# Patient Record
Sex: FEMALE | Race: WHITE | NOT HISPANIC OR LATINO | Employment: PART TIME
[De-identification: names, ages, dates, MRNs, and addresses within clinical notes are randomized per-mention and may not be internally consistent; named-entity substitution may affect disease eponyms.]

---

## 2019-04-18 ENCOUNTER — RECORDS - HEALTHEAST (OUTPATIENT)
Dept: ADMINISTRATIVE | Facility: OTHER | Age: 59
End: 2019-04-18

## 2019-04-18 LAB — HPV_EXT - HISTORICAL: NORMAL

## 2019-10-01 ENCOUNTER — OFFICE VISIT - HEALTHEAST (OUTPATIENT)
Dept: FAMILY MEDICINE | Facility: CLINIC | Age: 59
End: 2019-10-01

## 2019-10-01 DIAGNOSIS — Z01.818 PRE-OPERATIVE GENERAL PHYSICAL EXAMINATION: ICD-10-CM

## 2019-10-01 DIAGNOSIS — N81.89 OTHER FEMALE GENITAL PROLAPSE: ICD-10-CM

## 2019-10-01 DIAGNOSIS — J30.89 SEASONAL ALLERGIC RHINITIS DUE TO OTHER ALLERGIC TRIGGER: ICD-10-CM

## 2019-10-01 DIAGNOSIS — Z12.11 SCREEN FOR COLON CANCER: ICD-10-CM

## 2019-10-01 LAB
ALBUMIN SERPL-MCNC: 4.2 G/DL (ref 3.5–5)
ALP SERPL-CCNC: 73 U/L (ref 45–120)
ALT SERPL W P-5'-P-CCNC: 13 U/L (ref 0–45)
ANION GAP SERPL CALCULATED.3IONS-SCNC: 5 MMOL/L (ref 5–18)
AST SERPL W P-5'-P-CCNC: 20 U/L (ref 0–40)
BILIRUB SERPL-MCNC: 0.5 MG/DL (ref 0–1)
BUN SERPL-MCNC: 14 MG/DL (ref 8–22)
CALCIUM SERPL-MCNC: 10.9 MG/DL (ref 8.5–10.5)
CHLORIDE BLD-SCNC: 106 MMOL/L (ref 98–107)
CO2 SERPL-SCNC: 29 MMOL/L (ref 22–31)
CREAT SERPL-MCNC: 0.78 MG/DL (ref 0.6–1.1)
ERYTHROCYTE [DISTWIDTH] IN BLOOD BY AUTOMATED COUNT: 11.3 % (ref 11–14.5)
GFR SERPL CREATININE-BSD FRML MDRD: >60 ML/MIN/1.73M2
GLUCOSE BLD-MCNC: 98 MG/DL (ref 70–125)
HCT VFR BLD AUTO: 40.1 % (ref 35–47)
HGB BLD-MCNC: 13.2 G/DL (ref 12–16)
MCH RBC QN AUTO: 30.8 PG (ref 27–34)
MCHC RBC AUTO-ENTMCNC: 33 G/DL (ref 32–36)
MCV RBC AUTO: 93 FL (ref 80–100)
PLATELET # BLD AUTO: 264 THOU/UL (ref 140–440)
PMV BLD AUTO: 7.3 FL (ref 7–10)
POTASSIUM BLD-SCNC: 4.4 MMOL/L (ref 3.5–5)
PROT SERPL-MCNC: 7.3 G/DL (ref 6–8)
RBC # BLD AUTO: 4.29 MILL/UL (ref 3.8–5.4)
SODIUM SERPL-SCNC: 140 MMOL/L (ref 136–145)
WBC: 4.6 THOU/UL (ref 4–11)

## 2019-10-01 RX ORDER — FEXOFENADINE HCL 180 MG/1
180 TABLET ORAL DAILY
Status: SHIPPED | COMMUNITY
Start: 2019-10-01 | End: 2021-07-29

## 2019-10-01 ASSESSMENT — MIFFLIN-ST. JEOR: SCORE: 1246.37

## 2019-10-04 LAB
ATRIAL RATE - MUSE: 72 BPM
DIASTOLIC BLOOD PRESSURE - MUSE: 80 MMHG
INTERPRETATION ECG - MUSE: NORMAL
P AXIS - MUSE: 43 DEGREES
PR INTERVAL - MUSE: 164 MS
QRS DURATION - MUSE: 88 MS
QT - MUSE: 374 MS
QTC - MUSE: 409 MS
R AXIS - MUSE: 16 DEGREES
SYSTOLIC BLOOD PRESSURE - MUSE: 120 MMHG
T AXIS - MUSE: 36 DEGREES
VENTRICULAR RATE- MUSE: 72 BPM

## 2019-10-04 ASSESSMENT — MIFFLIN-ST. JEOR: SCORE: 1247.05

## 2019-10-07 ENCOUNTER — ANESTHESIA - HEALTHEAST (OUTPATIENT)
Dept: SURGERY | Facility: HOSPITAL | Age: 59
End: 2019-10-07

## 2019-10-07 ENCOUNTER — SURGERY - HEALTHEAST (OUTPATIENT)
Dept: SURGERY | Facility: HOSPITAL | Age: 59
End: 2019-10-07

## 2019-10-07 ASSESSMENT — MIFFLIN-ST. JEOR: SCORE: 1246.37

## 2019-10-23 ENCOUNTER — RECORDS - HEALTHEAST (OUTPATIENT)
Dept: HEALTH INFORMATION MANAGEMENT | Facility: CLINIC | Age: 59
End: 2019-10-23

## 2020-01-28 ENCOUNTER — COMMUNICATION - HEALTHEAST (OUTPATIENT)
Dept: FAMILY MEDICINE | Facility: CLINIC | Age: 60
End: 2020-01-28

## 2021-06-02 NOTE — ANESTHESIA PREPROCEDURE EVALUATION
Anesthesia Evaluation      Patient summary reviewed   No history of anesthetic complications     Airway    Pulmonary - negative ROS                          Cardiovascular - negative ROS  ECG reviewed (NSR)        Neuro/Psych - negative ROS     Endo/Other - negative ROS      GI/Hepatic/Renal - negative ROS      Other findings: Results for [address] (MRN [id number]) as of 10/7/2019 14:22    10/1/2019 11:18  Sodium: 140  Potassium: 4.4  Chloride: 106  CO2: 29  Anion Gap, Calculation: 5  BUN: 14  Creatinine: 0.78  GFR MDRD Af Amer: >60  GFR MDRD Non Af Amer: >60  Results for [address] (MRN [id number]) as of 10/7/2019 14:22    10/1/2019 11:18  WBC: 4.6  RBC: 4.29  Hemoglobin: 13.2  Hematocrit: 40.1  MCV: 93  MCH: 30.8  MCHC: 33.0  RDW: 11.3  Platelets: 264        Dental                         Anesthesia Plan  Planned anesthetic: general endotracheal  GAETT  Scopolamine for PONV  Antiemetics  Tylenol po pre op  Toradol at the end of case if okay with surgeon  Consider background propofol  Soft bite block  ASA 1   Induction: intravenous   Anesthetic plan and risks discussed with: patient    Post-op plan: routine recovery

## 2021-06-02 NOTE — ANESTHESIA POSTPROCEDURE EVALUATION
Patient: Radha Wilkinson  ROBOTIC ASSISTED LAPAROSCOPIC SUPRACERVICAL HYSTERECTOMY & SALPINGO-OOPHORECTOMY, SACROCOLPOPEXY, CYSTOSCOPY, POSTERIOR COLPORRHAPHY, PERINEORRHAPHY  Anesthesia type: general    Patient location: PACU  Last vitals:   Vitals Value Taken Time   /56 10/7/2019  8:20 PM   Temp 36.9  C (98.4  F) 10/7/2019  7:28 PM   Pulse 84 10/7/2019  8:19 PM   Resp 27 10/7/2019  8:19 PM   SpO2 99 % 10/7/2019  8:19 PM   Vitals shown include unvalidated device data.  Post vital signs: stable  Level of consciousness: awake and responds to simple questions  Post-anesthesia pain: pain controlled  Post-anesthesia nausea and vomiting: no  Pulmonary: unassisted, return to baseline  Cardiovascular: stable and blood pressure at baseline  Hydration: adequate  Anesthetic events: no    QCDR Measures:  ASA# 11 - Carleen-op Cardiac Arrest: ASA11B - Patient did NOT experience unanticipated cardiac arrest  ASA# 12 - Carleen-op Mortality Rate: ASA12B - Patient did NOT die  ASA# 13 - PACU Re-Intubation Rate: ASA13B - Patient did NOT require a new airway mgmt  ASA# 10 - Composite Anes Safety: ASA10A - No serious adverse event    Additional Notes:

## 2021-06-02 NOTE — ANESTHESIA CARE TRANSFER NOTE
Last vitals:   Vitals:    10/07/19 1928   BP: 132/62   Pulse: 93   Resp: 16   Temp: 36.9  C (98.4  F)   SpO2: 100%     Patient's level of consciousness is awake  Spontaneous respirations: yes  Maintains airway independently: yes  Dentition unchanged: yes  Oropharynx: oropharynx clear of all foreign objects    QCDR Measures:  ASA# 20 - Surgical Safety Checklist: WHO surgical safety checklist completed prior to induction    PQRS# 430 - Adult PONV Prevention: 4558F - Pt received => 2 anti-emetic agents (different classes) preop & intraop  ASA# 8 - Peds PONV Prevention: NA - Not pediatric patient, not GA or 2 or more risk factors NOT present  PQRS# 424 - Carleen-op Temp Management: 4559F - At least one body temp DOCUMENTED => 35.5C or 95.9F within required timeframe  PQRS# 426 - PACU Transfer Protocol: - Transfer of care checklist used  ASA# 14 - Acute Post-op Pain: ASA14B - Patient did NOT experience pain >= 7 out of 10     Volatile agents turned off, muscle relaxant reversed, 4/4 twitches with sustained tetany. Pt breathing spontaneously with adequate tidal volumes, following commands, gently suctioned oropharynx, extubated without issue. 10LPM O2 applied via face mask.Transported by CRNA and RN to recovery.

## 2021-06-03 VITALS
HEART RATE: 97 BPM | BODY MASS INDEX: 25.03 KG/M2 | OXYGEN SATURATION: 98 % | DIASTOLIC BLOOD PRESSURE: 80 MMHG | SYSTOLIC BLOOD PRESSURE: 120 MMHG | TEMPERATURE: 98.1 F | HEIGHT: 65 IN | WEIGHT: 150.25 LBS

## 2021-06-03 VITALS — BODY MASS INDEX: 25.03 KG/M2 | WEIGHT: 150.25 LBS | HEIGHT: 65 IN

## 2021-06-05 NOTE — TELEPHONE ENCOUNTER
Medication Request  Medication name: Anti Nausea medication   Requested Pharmacy: [id number]  Reason for request: Patient states she is going vacation to [place] a week form now she is going to be in boat  For 7 days requesting anti Nausea medication patch that last for 7 days she will not get sick when she is in  sea .  When did you use medication last?:  Unknown   Patient offered appointment:  N/A - electronic request  Okay to leave a detailed message: no

## 2021-06-05 NOTE — TELEPHONE ENCOUNTER
Please have her schedule a quick office visit appointment to discuss about traveling and  medication to help for nausea.

## 2021-06-16 PROBLEM — N81.3 UTEROVAGINAL PROLAPSE, COMPLETE: Status: ACTIVE | Noted: 2019-10-08

## 2021-06-16 PROBLEM — N81.11 CYSTOCELE, MIDLINE: Status: ACTIVE | Noted: 2019-10-08

## 2021-06-16 PROBLEM — N81.6 RECTOCELE: Status: ACTIVE | Noted: 2019-10-08

## 2021-06-17 NOTE — PATIENT INSTRUCTIONS - HE
Patient Instructions by Radha Harmon MD at 10/1/2019 10:00 AM     Author: Radha Harmon MD Service: -- Author Type: Physician    Filed: 10/2/2019  1:39 PM Encounter Date: 10/1/2019 Status: Addendum    : Radha Harmon MD (Physician)    Related Notes: Original Note by Radha Harmon MD (Physician) filed at 10/2/2019  1:38 PM       Patient Education     Pelvic Organ Prolapse: After Surgery    You may go home the day of surgery, or you may stay in the hospital for 1 or more days. The length of your stay is based on the surgery you had.  Catheter, drain, and IV  After surgery, it may be hard for you to urinate for a few days or weeks. A thin tube (catheter) helps drain your bladder. You may have a Cuevas catheter, which is put into the bladder through the urethra, or a suprapubic catheter, which is put into the bladder through a small incision in your abdomen. The catheter may be removed while you are in the hospital, or it may stay in place for a few days after you go home. Tubes may drain fluid from your incision. IV (intravenous) lines give you fluids and medicines. If you have a vaginal incision, you may have gauze packing in your vagina for 24 hours.  Managing pain  While in the hospital, a nurse can give you medicine to control pain. Or you may have a PCA (patient-controlled analgesia) pump attached to your IV line. This pump lets you give yourself pain medicine. It is normal to feel some pain. But if the pain bothers you, tell your nurse right away. If you go home soon after surgery, you will have a prescription for pain medicine to take with you. Call your doctor if this medicine doesnt relieve your pain.  Getting out of bed  You may be asked to get up and walk a little before you leave the hospital. Walking keeps your blood moving and helps prevent blood clots. Once you can get out of bed, you may be helped to the bathroom to see if you can urinate on your  own. When you can stand on your own, you may be able to take a shower.  Your doctor will tell you when you can leave the hospital. An adult friend or family member should drive you home. When you get home, care for yourself as instructed and schedule any needed follow-up visits with your doctor.  Don't lift, do strenuous exercise, or have sex for several weeks. Ask your doctor when you may resume these activities.  If you are still using the catheter when you leave the hospital, you may be told to:    Keep the catheter and the skin around it clean.    Clamp a suprapubic catheter to see whether you can urinate on your own. Then, measure the urine that drains from the catheter after you urinate.  Date Last Reviewed: 8/1/2017 2000-2019 [employer]. [address] [place] [address]. All rights reserved. This information is not intended as a substitute for professional medical care. Always follow your healthcare professional's instructions.           Hold all supplements, aspirin and NSAIDs for 7 days prior to surgery.  Follow your surgeon's direction on when to stop eating and drinking prior to surgery.  Your surgeon will be managing your pain after your surgery.    Remove all jewelry and metal piercings before your surgery.   Remove nail polish from fingers before surgery.

## 2021-06-26 ENCOUNTER — HEALTH MAINTENANCE LETTER (OUTPATIENT)
Age: 61
End: 2021-06-26

## 2021-06-28 NOTE — PROGRESS NOTES
Progress Notes by Radha Harmon MD at 10/1/2019 10:00 AM     Author: Radha Harmon MD Service: -- Author Type: Physician    Filed: 10/2/2019  1:39 PM Encounter Date: 10/1/2019 Status: Signed    : Radha Harmon MD (Physician)       Preoperative Exam    Scheduled Procedure: Hysterectomy w/pelvic mesh placement  Surgery Date:  10/7/2019   Surgery Location: [facility], fax [phone]    Surgeon:  Dr. Paulette Payne    Assessment/Plan:     1. Pre-operative general physical examination    - Comprehensive Metabolic Panel  - HM2(CBC w/o Differential)  - Electrocardiogram Perform - Clinic; Future  - Electrocardiogram Perform - Clinic    2. Other female genital prolapse  Scheduled for surgery.    3. Screen for colon cancer  Risk and benefit discussed, she will schedule screening colonoscopy at her earliest convenience after her recovery from above surgery.  - Ambulatory referral for Colonoscopy    4. Seasonal allergic rhinitis due to other allergic trigger  No recent exacerbation.    Surgical Procedure Risk: Low (reported cardiac risk generally < 1%)  Have you had prior anesthesia?: Yes  Have you or any family members had a previous anesthesia reaction:  No  Do you or any family members have a history of a clotting or bleeding disorder?: Yes: brother-blood clot; sister-blood clot  Cardiac Risk Assessment: no increased risk for major cardiac complications    Reviewed risks (not limited to bleeding,infection,pain,un-anticipated response to anesthesia ecc) and benefits (diagnostic and therapeutic) of surgery with patient, she understands the risks of the procedure and would like to proceed.  Patient's active problems diagnostically and therapeutically optimized for planned procedure with, Local or General anesthesia      Functional Status: Independent  Patient plans to recover at home with family.     Subjective:      Radha Wilkinson is a 58 y.o. female who presents for a  preoperative consultation.  New patient, 3 previous pregnancy and delivery, ongoing pressure in her lower abdomen, with pelvic organ prolapse not responding to conservative management, seen by GYN and deemed to be a good candidate for surgical treatment.    All other systems reviewed and are negative, other than those listed in the HPI.    Pertinent History  Do you have difficulty breathing or chest pain after walking up a flight of stairs: No  History of obstructive sleep apnea: No  Steroid use in the last 6 months: No  Frequent Aspirin/NSAID use: No  Prior Blood Transfusion: No  Prior Blood Transfusion Reaction: No  If for some reason prior to, during or after the procedure, if it is medically indicated, would you be willing to have a blood transfusion?:  There is no transfusion refusal.    Current Outpatient Medications   Medication Sig Dispense Refill   ? fexofenadine (ALLEGRA) 180 MG tablet Take 180 mg by mouth.     ? multivitamin (ONE A DAY) per tablet Take by mouth.       No current facility-administered medications for this visit.         Allergies   Allergen Reactions   ? Penicillins Hives   ? Cephalosporins Itching     And hives     ? Sulfa (Sulfonamide Antibiotics) Itching       There is no problem list on file for this patient.      History reviewed. No pertinent past medical history.    Past Surgical History:   Procedure Laterality Date   ? BREAST SURGERY Bilateral     AUGMENTATION   ? Vaginal Cyst Removal         Social History     Socioeconomic History   ? Marital status:      Spouse name: Not on file   ? Number of children: Not on file   ? Years of education: Not on file   ? Highest education level: Not on file   Occupational History   ? Not on file   Social Needs   ? Financial resource strain: Not on file   ? Food insecurity:     Worry: Not on file     Inability: Not on file   ? Transportation needs:     Medical: Not on file     Non-medical: Not on file   Tobacco Use   ? Smoking status:  "Former Smoker     Packs/day: 0.00     Last attempt to quit: 1990     Years since quittin.7   ? Smokeless tobacco: Never Used   Substance and Sexual Activity   ? Alcohol use: Yes     Frequency: 2-4 times a month   ? Drug use: Never   ? Sexual activity: Yes   Lifestyle   ? Physical activity:     Days per week: Not on file     Minutes per session: Not on file   ? Stress: Not on file   Relationships   ? Social connections:     Talks on phone: Not on file     Gets together: Not on file     Attends Je[employer]'s witness service: Not on file     Active member of club or organization: Not on file     Attends meetings of clubs or organizations: Not on file     Relationship status: Not on file   ? Intimate partner violence:     Fear of current or ex partner: Not on file     Emotionally abused: Not on file     Physically abused: Not on file     Forced sexual activity: Not on file   Other Topics Concern   ? Not on file   Social History Narrative   ? Not on file       Patient Care Team:  Radha Harmon MD as PCP - General (Family Medicine)          Objective:     Vitals:    10/01/19 1027   BP: 120/80   Pulse: 97   Temp: 98.1  F (36.7  C)   TempSrc: Oral   SpO2: 98%   Weight: 150 lb 4 oz (68.2 kg)   Height: 5' 5.25\" (1.657 m)         Physical Exam:  Physical Examination: General appearance - alert, well appearing, and in no distress  Mental status - alert, oriented to person, place, and time  Eyes - pupils equal and reactive, extraocular eye movements intact  Ears - bilateral TM's and external ear canals normal  Nose - normal and patent, no erythema, discharge or polyps  Mouth - mucous membranes moist, pharynx normal without lesions  Neck - supple, no significant adenopathy  Lymphatics - no palpable lymphadenopathy, no hepatosplenomegaly  Chest - clear to auscultation, no wheezes, rales or rhonchi, symmetric air entry  Heart - normal rate, regular rhythm, normal S1, S2, no murmurs, rubs, clicks or gallops  Abdomen - soft, " nontender, nondistended, no masses or organomegaly  Back exam - full range of motion, no tenderness, palpable spasm or pain on motion  Neurological - alert, oriented, normal speech, no focal findings or movement disorder noted  Musculoskeletal - no joint tenderness, deformity or swelling  Extremities - peripheral pulses normal, no pedal edema, no clubbing or cyanosis  Skin - normal coloration and turgor, no rashes, no suspicious skin lesions noted    Patient Instructions   Patient Education     Pelvic Organ Prolapse: After Surgery    You may go home the day of surgery, or you may stay in the hospital for 1 or more days. The length of your stay is based on the surgery you had.  Catheter, drain, and IV  After surgery, it may be hard for you to urinate for a few days or weeks. A thin tube (catheter) helps drain your bladder. You may have a Cuevas catheter, which is put into the bladder through the urethra, or a suprapubic catheter, which is put into the bladder through a small incision in your abdomen. The catheter may be removed while you are in the hospital, or it may stay in place for a few days after you go home. Tubes may drain fluid from your incision. IV (intravenous) lines give you fluids and medicines. If you have a vaginal incision, you may have gauze packing in your vagina for 24 hours.  Managing pain  While in the hospital, a nurse can give you medicine to control pain. Or you may have a PCA (patient-controlled analgesia) pump attached to your IV line. This pump lets you give yourself pain medicine. It is normal to feel some pain. But if the pain bothers you, tell your nurse right away. If you go home soon after surgery, you will have a prescription for pain medicine to take with you. Call your doctor if this medicine doesnt relieve your pain.  Getting out of bed  You may be asked to get up and walk a little before you leave the hospital. Walking keeps your blood moving and helps prevent blood clots. Once  you can get out of bed, you may be helped to the bathroom to see if you can urinate on your own. When you can stand on your own, you may be able to take a shower.  Your doctor will tell you when you can leave the hospital. An adult friend or family member should drive you home. When you get home, care for yourself as instructed and schedule any needed follow-up visits with your doctor.  Don't lift, do strenuous exercise, or have sex for several weeks. Ask your doctor when you may resume these activities.  If you are still using the catheter when you leave the hospital, you may be told to:    Keep the catheter and the skin around it clean.    Clamp a suprapubic catheter to see whether you can urinate on your own. Then, measure the urine that drains from the catheter after you urinate.  Date Last Reviewed: 8/1/2017 2000-2019 [employer]. [address] [place] [address]. All rights reserved. This information is not intended as a substitute for professional medical care. Always follow your healthcare professional's instructions.           Hold all supplements, aspirin and NSAIDs for 7 days prior to surgery.  Follow your surgeon's direction on when to stop eating and drinking prior to surgery.  Your surgeon will be managing your pain after your surgery.    Remove all jewelry and metal piercings before your surgery.   Remove nail polish from fingers before surgery.      EKG: Independently reviewed, normal sinus rhythm, nonspecific ST-T wave abnormalities.    Labs:  Recent Results (from the past 48 hour(s))   Electrocardiogram Perform - Clinic    Collection Time: 10/01/19 11:11 AM   Result Value Ref Range    SYSTOLIC BLOOD PRESSURE 120 mmHg    DIASTOLIC BLOOD PRESSURE 80 mmHg    VENTRICULAR RATE 72 BPM    ATRIAL RATE 72 BPM    P-R INTERVAL 164 ms    QRS DURATION 88 ms    Q-T INTERVAL 374 ms    QTC CALCULATION (BEZET) 409 ms    P Axis 43 degrees    R AXIS 16 degrees    T AXIS 36 degrees    MUSE  DIAGNOSIS       Normal sinus rhythm  Normal ECG  No previous ECGs available     Comprehensive Metabolic Panel    Collection Time: 10/01/19 11:18 AM   Result Value Ref Range    Sodium 140 136 - 145 mmol/L    Potassium 4.4 3.5 - 5.0 mmol/L    Chloride 106 98 - 107 mmol/L    CO2 29 22 - 31 mmol/L    Anion Gap, Calculation 5 5 - 18 mmol/L    Glucose 98 70 - 125 mg/dL    BUN 14 8 - 22 mg/dL    Creatinine 0.78 0.60 - 1.10 mg/dL    GFR MDRD Af Amer >60 >60 mL/min/1.73m2    GFR MDRD Non Af Amer >60 >60 mL/min/1.73m2    Bilirubin, Total 0.5 0.0 - 1.0 mg/dL    Calcium 10.9 (H) 8.5 - 10.5 mg/dL    Protein, Total 7.3 6.0 - 8.0 g/dL    Albumin 4.2 3.5 - 5.0 g/dL    Alkaline Phosphatase 73 45 - 120 U/L    AST 20 0 - 40 U/L    ALT 13 0 - 45 U/L   HM2(CBC w/o Differential)    Collection Time: 10/01/19 11:18 AM   Result Value Ref Range    WBC 4.6 4.0 - 11.0 thou/uL    RBC 4.29 3.80 - 5.40 mill/uL    Hemoglobin 13.2 12.0 - 16.0 g/dL    Hematocrit 40.1 35.0 - 47.0 %    MCV 93 80 - 100 fL    MCH 30.8 27.0 - 34.0 pg    MCHC 33.0 32.0 - 36.0 g/dL    RDW 11.3 11.0 - 14.5 %    Platelets 264 140 - 440 thou/uL    MPV 7.3 7.0 - 10.0 fL        Immunization History   Administered Date(s) Administered   ? Influenza,seasonal, Inj IIV3 09/23/2009   ? Tdap 10/01/2019           Electronically signed by Radha Harmon MD 10/02/19 10:21 AM

## 2021-07-23 ENCOUNTER — LAB (OUTPATIENT)
Dept: LAB | Facility: CLINIC | Age: 61
End: 2021-07-23

## 2021-07-23 ENCOUNTER — OFFICE VISIT (OUTPATIENT)
Dept: INTERNAL MEDICINE | Facility: CLINIC | Age: 61
End: 2021-07-23
Payer: COMMERCIAL

## 2021-07-23 VITALS
WEIGHT: 153 LBS | BODY MASS INDEX: 25.27 KG/M2 | DIASTOLIC BLOOD PRESSURE: 82 MMHG | OXYGEN SATURATION: 98 % | SYSTOLIC BLOOD PRESSURE: 124 MMHG | HEART RATE: 88 BPM

## 2021-07-23 DIAGNOSIS — Z00.00 ROUTINE GENERAL MEDICAL EXAMINATION AT A HEALTH CARE FACILITY: ICD-10-CM

## 2021-07-23 DIAGNOSIS — Z00.00 ROUTINE GENERAL MEDICAL EXAMINATION AT A HEALTH CARE FACILITY: Primary | ICD-10-CM

## 2021-07-23 LAB
ALBUMIN SERPL-MCNC: 4 G/DL (ref 3.4–5)
ALP SERPL-CCNC: 74 U/L (ref 40–150)
ALT SERPL W P-5'-P-CCNC: 24 U/L (ref 0–50)
ANION GAP SERPL CALCULATED.3IONS-SCNC: 1 MMOL/L (ref 3–14)
AST SERPL W P-5'-P-CCNC: 24 U/L (ref 0–45)
BILIRUB SERPL-MCNC: 0.4 MG/DL (ref 0.2–1.3)
BUN SERPL-MCNC: 18 MG/DL (ref 7–30)
CALCIUM SERPL-MCNC: 9.6 MG/DL (ref 8.5–10.1)
CHLORIDE BLD-SCNC: 110 MMOL/L (ref 94–109)
CHOLEST SERPL-MCNC: 222 MG/DL
CO2 SERPL-SCNC: 29 MMOL/L (ref 20–32)
CREAT SERPL-MCNC: 0.72 MG/DL (ref 0.52–1.04)
DEPRECATED CALCIDIOL+CALCIFEROL SERPL-MC: 23 UG/L (ref 20–75)
FASTING STATUS PATIENT QL REPORTED: YES
GFR SERPL CREATININE-BSD FRML MDRD: 90 ML/MIN/1.73M2
GLUCOSE BLD-MCNC: 94 MG/DL (ref 70–99)
HDLC SERPL-MCNC: 91 MG/DL
LDLC SERPL CALC-MCNC: 120 MG/DL
NONHDLC SERPL-MCNC: 131 MG/DL
POTASSIUM BLD-SCNC: 4.5 MMOL/L (ref 3.4–5.3)
PROT SERPL-MCNC: 7.7 G/DL (ref 6.8–8.8)
SODIUM SERPL-SCNC: 140 MMOL/L (ref 133–144)
TRIGL SERPL-MCNC: 55 MG/DL
TSH SERPL DL<=0.005 MIU/L-ACNC: 0.75 MU/L (ref 0.4–4)

## 2021-07-23 PROCEDURE — 80053 COMPREHEN METABOLIC PANEL: CPT | Performed by: PATHOLOGY

## 2021-07-23 PROCEDURE — 84443 ASSAY THYROID STIM HORMONE: CPT | Performed by: PATHOLOGY

## 2021-07-23 PROCEDURE — 36415 COLL VENOUS BLD VENIPUNCTURE: CPT | Performed by: PATHOLOGY

## 2021-07-23 PROCEDURE — 82306 VITAMIN D 25 HYDROXY: CPT | Mod: 90 | Performed by: PATHOLOGY

## 2021-07-23 PROCEDURE — 99386 PREV VISIT NEW AGE 40-64: CPT | Performed by: INTERNAL MEDICINE

## 2021-07-23 PROCEDURE — 80061 LIPID PANEL: CPT | Performed by: PATHOLOGY

## 2021-07-23 ASSESSMENT — PAIN SCALES - GENERAL: PAINLEVEL: NO PAIN (0)

## 2021-07-23 NOTE — NURSING NOTE
Chief Complaint   Patient presents with     Physical     pt here for physical       Ed Charles CMA, EMT at 8:04 AM on 7/23/2021.

## 2021-07-23 NOTE — PATIENT INSTRUCTIONS
I recommend getting the Shingles Vaccine at a local pharmacy, it will be less cost to you than in the clinic.  It was a pleasure to meet you today!  I will be in touch about your test results via [employer].    Dr. Gomez     [facility] Refill Request Information:    Please contact your pharmacy regarding any request for medication refills. The [facility] prescription fax number is [phone]. Please allow three business days for routine medication refills and five business days for controlled substance medication refills.    [facility] Test Result Notification Information:    You will be notified within seven to ten day of your appointment regarding your test results. If you are on MyChart, you will be notified as soon as the provider has reviewed and signed the results.     If you need anything else, feel free to contact the [facility] Center at [phone].    To schedule a mammgram, please call radiology scheduling at [phone].

## 2021-07-29 NOTE — PROGRESS NOTES
Radha is a very pleasant 60 year old female who was seen today for physical and establish care.    In general she feels good.  10+ ROS negative for any concerns.  She is overdue for a few screening tests, including colonoscopy, mammogram, labs.    Mental health is doing well, all things considered; was able to adapt to the stress of the pandemic.     No changes to past, family, or social history.  I discontinued several medications that were used for post surgical care in 2019.    PHYSICAL EXAM:  /82 (BP Location: Right arm, Patient Position: Sitting, Cuff Size: Adult Regular)   Pulse 88   Wt 69.4 kg (153 lb)   SpO2 98%   BMI 25.27 kg/m    Constitutional: no distress, comfortable, pleasant   Eyes: anicteric, normal extra-ocular movements   Ears, Nose and Throat: tympanic membranes clear, nose clear and free of lesions, throat clear, neck supple with full range of motion, no thyromegaly.   Cardiovascular: regular rate and rhythm, normal S1 and S2, no murmurs, rubs or gallops, peripheral pulses full and symmetric   Respiratory: clear to auscultation, no wheezes or crackles, normal breath sounds   Gastrointestinal: positive bowel sounds, nontender, no hepatosplenomegaly, no masses   Musculoskeletal: knees full range of motion, no edema   Skin: no concerning lesions, no jaundice   Neurological: normal gait, no tremor   Psychological: appropriate mood   Lymphatic: no cervical lymphadenopathy and no pedal edema      A/P 60 year old female here for physical/establish care, doing well    Routine general medical examination at a health care facility  -     Lipid Profile FASTING; Future  -     Comprehensive metabolic panel; Future  -     TSH with free T4 reflex; Future  -     Vitamin D Deficiency; Future  -     Adult Gastro Ref - Procedure Only; Future  -     Mammogram, screening w beth (3D); Future    RTC in one year, sooner prn.  I gave her my card.    Monalisa Saul MD

## 2021-10-16 ENCOUNTER — HEALTH MAINTENANCE LETTER (OUTPATIENT)
Age: 61
End: 2021-10-16

## 2022-10-01 ENCOUNTER — HEALTH MAINTENANCE LETTER (OUTPATIENT)
Age: 62
End: 2022-10-01

## 2022-12-21 ENCOUNTER — MYC MEDICAL ADVICE (OUTPATIENT)
Dept: INTERNAL MEDICINE | Facility: CLINIC | Age: 62
End: 2022-12-21

## 2022-12-22 ENCOUNTER — E-VISIT (OUTPATIENT)
Dept: URGENT CARE | Facility: CLINIC | Age: 62
End: 2022-12-22
Payer: COMMERCIAL

## 2022-12-22 DIAGNOSIS — R05.9 COUGH, UNSPECIFIED TYPE: Primary | ICD-10-CM

## 2022-12-22 DIAGNOSIS — Z20.822 SUSPECTED COVID-19 VIRUS INFECTION: ICD-10-CM

## 2022-12-22 PROCEDURE — 99421 OL DIG E/M SVC 5-10 MIN: CPT | Mod: CS | Performed by: PHYSICIAN ASSISTANT

## 2022-12-22 NOTE — PATIENT INSTRUCTIONS
Radha,    Your symptoms show that you may have coronavirus (COVID-19). This illness can cause fever, cough and trouble breathing. Many people get a mild case and get better on their own. Some people can get very sick.    Because you reported additional symptoms, I would like to also test you for Influenza.    What should I do?  I have placed orders for these tests.   To schedule: go to your [employer] home page and scroll down to the section that says  You have an appointment that needs to be scheduled  and click the large green button that says  Schedule Now  and follow the steps to find the next available openings.     If you are unable to complete these [employer] scheduling steps, please call [phone] to schedule your testing.     These guidelines are for isolating before returning to work, school or .   For employers, schools and day cares: This is an official notice for this person s medical guidelines for returning in-person.   For health care sites: The [facility] gives different isolation and quarantine guidelines for healthcare sites, please check with these sites before arriving.     How do I self-isolate?  You isolate when you have symptoms of COVID or a test shows you have COVID, even if you don t have symptoms.   If you DO have symptoms:  Stay home and away from others  For at least 5 days after your symptoms started, AND   You are fever free for 24 hours (with no medicine that reduces fever), AND  Your other symptoms are better.  Wear a mask for 10 full days any time you are around others.  If you DON T have symptoms:  Stay at home and away from others for at least 5 days after your positive test.  Wear a mask for 10 full days any time you are around others.    How can I take care of myself?  Over the counter medications may help with your symptoms such as runny or stuffy nose, cough, chills, or fever. Talk to your care team about your options.     Some people are at high risk of severe illness (for  example, you have a weak immune system, you re 65 years or older, or you have certain medical problems). If your risk is high and your symptoms started in the last 5 to 7 days, we strongly recommend for you to get COVID treatment as soon as possible. Paxlovid, Molnupiravir and the monoclonal antibody treatments are proven safe and effective, make you feel better faster, and prevent hospitalization and death.       To schedule an appointment to discuss COVID treatment, request an appointment on [employer] (select  COVID-19 Treatment ) or call [facility] ([phone]), press [phone].    Get lots of rest. Drink extra fluids (unless a doctor has told you not to)  Take Tylenol (acetaminophen) or ibuprofen for fever or pain. If you have liver or kidney problems, ask your family doctor if it's okay to take Tylenol or ibuprofen  Take over the counter medications for your symptoms, as directed by your doctor. You may also talk to your pharmacist.    If you have other health problems (like cancer, heart failure, an organ transplant or severe kidney disease): Call your specialty clinic if you don't feel better in the next 2 days.  Know when to call 911. Emergency warning signs include:  Trouble breathing or shortness of breath  Pain or pressure in the chest that doesn't go away  Feeling confused like you haven't felt before, or not being able to wake up  Bluish-colored lips or face    Where can I get more information?  [facility] - About COVID-19: [email or web address]   CDC - What to Do If You're Sick: https://www.cdc.gov/coronavirus/2019-ncov/if-you-are-sick/index.html   CDC - Quarantine & Isolation: https://www.cdc.gov/coronavirus/2019-ncov/your-health/quarantine-isolation.html   [facility] clinical trials (COVID-19 research studies): [email or web address].[email or web address]/n-clinical-trials  Below are the COVID-19 hotlines at the [place] [facility] ([facility]). Interpreters are available.  [facility] Smart Panel  questions: Call [phone] or [phone] (7 a.m. to 7 p.m.)  For questions about schools and childcare: Call 736-419-7910 or 1-295.937.3091 (7 a.m. to 7 p.m.)

## 2023-08-06 ENCOUNTER — HEALTH MAINTENANCE LETTER (OUTPATIENT)
Age: 63
End: 2023-08-06

## 2023-10-15 ENCOUNTER — HEALTH MAINTENANCE LETTER (OUTPATIENT)
Age: 63
End: 2023-10-15

## 2023-12-24 ENCOUNTER — HEALTH MAINTENANCE LETTER (OUTPATIENT)
Age: 63
End: 2023-12-24

## 2025-01-18 ENCOUNTER — HEALTH MAINTENANCE LETTER (OUTPATIENT)
Age: 65
End: 2025-01-18

## 2025-08-10 ENCOUNTER — HEALTH MAINTENANCE LETTER (OUTPATIENT)
Age: 65
End: 2025-08-10